# Patient Record
Sex: MALE | Race: WHITE | NOT HISPANIC OR LATINO | Employment: OTHER | URBAN - METROPOLITAN AREA
[De-identification: names, ages, dates, MRNs, and addresses within clinical notes are randomized per-mention and may not be internally consistent; named-entity substitution may affect disease eponyms.]

---

## 2017-02-08 NOTE — PATIENT DISCUSSION
Continue: Maxitrol (neomycin-polymyxin-dexameth): ointment: 3.5-10,000-0.1 mg-unit/g-% 1 a small amount every other day into affected eye 08-

## 2017-08-31 NOTE — PATIENT DISCUSSION
Fuchs' Endothelial Dystrophy Counseling: The diagnosis and its pathophysiology was explained to the patient. The importance of continued follow-up including yearly endothelial cell counts and pachymetry measurements was also explained. The patient was counseled on the prescribed medical treatment and the early morning blurring of vision which will improve as the day progresses. The patient was advised of the possibility of decreased vision over time secondary to corneal edema. The possible need for a future cornea transplant was explained. Return for follow-up as scheduled.

## 2017-10-30 ENCOUNTER — ESTABLISHED COMPREHENSIVE EXAM (OUTPATIENT)
Dept: URBAN - METROPOLITAN AREA CLINIC 39 | Facility: CLINIC | Age: 82
End: 2017-10-30

## 2017-10-30 DIAGNOSIS — H40.051: ICD-10-CM

## 2017-10-30 DIAGNOSIS — H43.813: ICD-10-CM

## 2017-10-30 DIAGNOSIS — H35.3221: ICD-10-CM

## 2017-10-30 DIAGNOSIS — Z96.1: ICD-10-CM

## 2017-10-30 PROCEDURE — 1036F TOBACCO NON-USER: CPT

## 2017-10-30 PROCEDURE — G8756 NO BP MEASURE DOC: HCPCS

## 2017-10-30 PROCEDURE — 4177F TALK PT/CRGVR RE AREDS PREV: CPT

## 2017-10-30 PROCEDURE — 67028 INJECTION EYE DRUG: CPT

## 2017-10-30 PROCEDURE — 9222550 BILAT EXTENDED OPHTHALMOSCOPY, FIRST

## 2017-10-30 PROCEDURE — 92134 CPTRZ OPH DX IMG PST SGM RTA: CPT

## 2017-10-30 PROCEDURE — G8427 DOCREV CUR MEDS BY ELIG CLIN: HCPCS

## 2017-10-30 PROCEDURE — 92014 COMPRE OPH EXAM EST PT 1/>: CPT

## 2017-10-30 PROCEDURE — 2019F DILATED MACUL EXAM DONE: CPT

## 2017-10-30 ASSESSMENT — TONOMETRY
OD_IOP_MMHG: 22
OS_IOP_MMHG: 17

## 2017-10-30 ASSESSMENT — VISUAL ACUITY: OS_CC: 20/30-2

## 2017-12-08 ENCOUNTER — ESTABLISHED COMPREHENSIVE EXAM (OUTPATIENT)
Dept: URBAN - METROPOLITAN AREA CLINIC 39 | Facility: CLINIC | Age: 82
End: 2017-12-08

## 2017-12-08 DIAGNOSIS — H40.051: ICD-10-CM

## 2017-12-08 DIAGNOSIS — H35.363: ICD-10-CM

## 2017-12-08 DIAGNOSIS — H35.3221: ICD-10-CM

## 2017-12-08 DIAGNOSIS — H35.60: ICD-10-CM

## 2017-12-08 DIAGNOSIS — H43.813: ICD-10-CM

## 2017-12-08 DIAGNOSIS — H35.32: ICD-10-CM

## 2017-12-08 PROCEDURE — G8427 DOCREV CUR MEDS BY ELIG CLIN: HCPCS

## 2017-12-08 PROCEDURE — 2019F DILATED MACUL EXAM DONE: CPT

## 2017-12-08 PROCEDURE — 67028 INJECTION EYE DRUG: CPT

## 2017-12-08 PROCEDURE — 4177F TALK PT/CRGVR RE AREDS PREV: CPT

## 2017-12-08 PROCEDURE — 92014 COMPRE OPH EXAM EST PT 1/>: CPT

## 2017-12-08 PROCEDURE — G8756 NO BP MEASURE DOC: HCPCS

## 2017-12-08 PROCEDURE — 1036F TOBACCO NON-USER: CPT

## 2017-12-08 PROCEDURE — 92134 CPTRZ OPH DX IMG PST SGM RTA: CPT

## 2017-12-08 ASSESSMENT — TONOMETRY
OD_IOP_MMHG: 13
OS_IOP_MMHG: 20

## 2017-12-08 ASSESSMENT — VISUAL ACUITY: OS_CC: 20/40-2

## 2018-01-19 ENCOUNTER — ESTABLISHED PATIENT (OUTPATIENT)
Dept: URBAN - METROPOLITAN AREA CLINIC 43 | Facility: CLINIC | Age: 83
End: 2018-01-19

## 2018-01-19 DIAGNOSIS — H35.363: ICD-10-CM

## 2018-01-19 DIAGNOSIS — H35.3213: ICD-10-CM

## 2018-01-19 DIAGNOSIS — H35.3221: ICD-10-CM

## 2018-01-19 DIAGNOSIS — H35.62: ICD-10-CM

## 2018-01-19 DIAGNOSIS — H40.051: ICD-10-CM

## 2018-01-19 DIAGNOSIS — H43.813: ICD-10-CM

## 2018-01-19 PROCEDURE — 2019F DILATED MACUL EXAM DONE: CPT

## 2018-01-19 PROCEDURE — G8428 CUR MEDS NOT DOCUMENT: HCPCS

## 2018-01-19 PROCEDURE — 92012 INTRM OPH EXAM EST PATIENT: CPT

## 2018-01-19 PROCEDURE — 67028 INJECTION EYE DRUG: CPT

## 2018-01-19 PROCEDURE — 1036F TOBACCO NON-USER: CPT

## 2018-01-19 PROCEDURE — 4177F TALK PT/CRGVR RE AREDS PREV: CPT

## 2018-01-19 ASSESSMENT — TONOMETRY
OS_IOP_MMHG: 17
OD_IOP_MMHG: 14

## 2018-01-19 ASSESSMENT — VISUAL ACUITY: OS_SC: 20/30-2

## 2018-01-24 NOTE — PATIENT DISCUSSION
*BLEPHARITIS, OU: PRESCRIBE WARM COMPRESSES AND EYELID SCRUBS QD-BID, ARTIFICIAL TEARS BID-QID, AND USE MAXITROL OINTMENT QHS OU EVERY OTHER DAY. RECOMMEND THE DAILY INTAKE OF OMEGA-3 FATTY ACIDS WITH PRIMARY CARE PHYSICIANS APPROVAL. RETURN FOR FOLLOW-UP AS SCHEDULED.

## 2018-01-24 NOTE — PATIENT DISCUSSION
Continue: Jennifer 128 (sodium chloride): ointment: 5% 1 a small amount every morning into both eyes

## 2018-01-24 NOTE — PATIENT DISCUSSION
Continue: PreserVision AREDS 2 (vit c,d-dg-qrwdj-lutein-zeaxan): capsule: 715-353-26-8 mg-unit-mg-mg 1 capsule twice a day by mouth

## 2018-01-24 NOTE — PATIENT DISCUSSION
Continue: Retaine MGD (PF) (light mineral oil-min oil (pf)): dropperette: 0.5-0.5% 1 drop three times a day into both eyes

## 2018-01-24 NOTE — PATIENT DISCUSSION
FUCHS' ENDOTHELIAL DYSTROPHY,OU:  MILD GUTTATA AND WITH MILD EDEMA. USE CONSUELO 128 5% OINTMENT QAM OU TREATMENT INDICATED. FOLLOW.

## 2018-02-08 NOTE — PATIENT DISCUSSION
Continue: PreserVision AREDS 2 (vit c,y-nm-zozvm-lutein-zeaxan): capsule: 038-401-88-8 mg-unit-mg-mg 1 capsule twice a day by mouth

## 2018-02-08 NOTE — PATIENT DISCUSSION
Continue: Maxitrol (neomycin-polymyxin-dexameth): ointment: 3.5-10,000-0.1 mg-unit/g-% 1 a small amount once a week into both eyes 08-

## 2018-03-07 ENCOUNTER — ESTABLISHED PATIENT (OUTPATIENT)
Dept: URBAN - METROPOLITAN AREA CLINIC 39 | Facility: CLINIC | Age: 83
End: 2018-03-07

## 2018-03-07 DIAGNOSIS — H35.3221: ICD-10-CM

## 2018-03-07 DIAGNOSIS — H35.733: ICD-10-CM

## 2018-03-07 DIAGNOSIS — H35.62: ICD-10-CM

## 2018-03-07 DIAGNOSIS — H35.3213: ICD-10-CM

## 2018-03-07 DIAGNOSIS — H35.363: ICD-10-CM

## 2018-03-07 DIAGNOSIS — H43.813: ICD-10-CM

## 2018-03-07 PROCEDURE — 92012 INTRM OPH EXAM EST PATIENT: CPT

## 2018-03-07 PROCEDURE — G8428 CUR MEDS NOT DOCUMENT: HCPCS

## 2018-03-07 PROCEDURE — G8756 NO BP MEASURE DOC: HCPCS

## 2018-03-07 PROCEDURE — 1036F TOBACCO NON-USER: CPT

## 2018-03-07 PROCEDURE — 2019F DILATED MACUL EXAM DONE: CPT

## 2018-03-07 PROCEDURE — 67028 INJECTION EYE DRUG: CPT

## 2018-03-07 PROCEDURE — 92134 CPTRZ OPH DX IMG PST SGM RTA: CPT

## 2018-03-07 PROCEDURE — 9222650 BILAT EXTENDED OPHTHALMOSCOPY, F/U

## 2018-03-07 PROCEDURE — 4177F TALK PT/CRGVR RE AREDS PREV: CPT

## 2018-03-07 ASSESSMENT — TONOMETRY
OS_IOP_MMHG: 14
OD_IOP_MMHG: 10

## 2018-03-07 ASSESSMENT — VISUAL ACUITY: OS_CC: 20/30-2

## 2018-04-10 NOTE — PATIENT DISCUSSION
Continue: prednisolone acetate (prednisolone acetate): drops,suspension: 1% 1 drop four times a day as directed into right eye 03-

## 2018-04-10 NOTE — PATIENT DISCUSSION
Discussed DMEK  in detail with patient including use of gas bubble to adhere corneal tissue. Discussed laying flat to improve outcome 24-72 hours. Discussed expect blurred vision initially after surgery - possible additional gas inserted in OR. Explained likely blurred vision while healing on average 3-6 months. 20/20 is NOT the goal - aiming for better quality of vision with outcome of 20/30.

## 2018-04-10 NOTE — PATIENT DISCUSSION
Continue: PreserVision AREDS 2 (vit c,l-rn-wisug-lutein-zeaxan): capsule: 572-354-11-9 mg-unit-mg-mg 1 capsule twice a day by mouth

## 2018-04-12 NOTE — PATIENT DISCUSSION
Continue: PreserVision AREDS 2 (vit c,d-zv-rhkhy-lutein-zeaxan): capsule: 556-222-87-8 mg-unit-mg-mg 1 capsule twice a day by mouth

## 2018-04-19 ENCOUNTER — ESTABLISHED PATIENT (OUTPATIENT)
Dept: URBAN - METROPOLITAN AREA CLINIC 35 | Facility: CLINIC | Age: 83
End: 2018-04-19

## 2018-04-19 DIAGNOSIS — H43.813: ICD-10-CM

## 2018-04-19 DIAGNOSIS — H35.3221: ICD-10-CM

## 2018-04-19 DIAGNOSIS — H35.363: ICD-10-CM

## 2018-04-19 DIAGNOSIS — H35.62: ICD-10-CM

## 2018-04-19 DIAGNOSIS — H35.733: ICD-10-CM

## 2018-04-19 DIAGNOSIS — H35.3213: ICD-10-CM

## 2018-04-19 PROCEDURE — 2019F DILATED MACUL EXAM DONE: CPT

## 2018-04-19 PROCEDURE — 67028 INJECTION EYE DRUG: CPT

## 2018-04-19 PROCEDURE — 4177F TALK PT/CRGVR RE AREDS PREV: CPT

## 2018-04-19 PROCEDURE — 1036F TOBACCO NON-USER: CPT

## 2018-04-19 PROCEDURE — G8428 CUR MEDS NOT DOCUMENT: HCPCS

## 2018-04-19 PROCEDURE — 92012 INTRM OPH EXAM EST PATIENT: CPT

## 2018-04-19 PROCEDURE — G8756 NO BP MEASURE DOC: HCPCS

## 2018-04-19 PROCEDURE — 92134 CPTRZ OPH DX IMG PST SGM RTA: CPT

## 2018-04-19 PROCEDURE — 9222650 BILAT EXTENDED OPHTHALMOSCOPY, F/U

## 2018-04-19 ASSESSMENT — VISUAL ACUITY: OS_CC: 20/40+2

## 2018-04-19 ASSESSMENT — TONOMETRY
OD_IOP_MMHG: 13
OS_IOP_MMHG: 16

## 2018-04-19 NOTE — PATIENT DISCUSSION
Continue: PreserVision AREDS 2 (vit c,n-ue-pajmp-lutein-zeaxan): capsule: 123-624-35-6 mg-unit-mg-mg 1 capsule twice a day by mouth

## 2018-05-03 NOTE — PATIENT DISCUSSION
Continue: PreserVision AREDS 2 (vit c,y-dl-aqisj-lutein-zeaxan): capsule: 370-387-49-0 mg-unit-mg-mg 1 capsule twice a day by mouth

## 2018-05-18 NOTE — PATIENT DISCUSSION
Continue: PreserVision AREDS 2 (vit c,d-yt-zmtxe-lutein-zeaxan): capsule: 025-639-25-9 mg-unit-mg-mg 1 capsule twice a day by mouth

## 2018-06-07 NOTE — PATIENT DISCUSSION
Continue: PreserVision AREDS 2 (vit c,z-rb-nzwpw-lutein-zeaxan): capsule: 525-012-92-3 mg-unit-mg-mg 1 capsule twice a day by mouth

## 2018-06-07 NOTE — PATIENT DISCUSSION
Continue: prednisolone acetate (prednisolone acetate): drops,suspension: 1% 1 drop four times a day into right eye

## 2018-06-28 NOTE — PATIENT DISCUSSION
Continue: prednisolone acetate (prednisolone acetate): drops,suspension: 1% 1 drop three times a day into right eye

## 2018-06-28 NOTE — PATIENT DISCUSSION
Continue: PreserVision AREDS 2 (vit c,c-ta-reyxg-lutein-zeaxan): capsule: 499-591-83-1 mg-unit-mg-mg 1 capsule twice a day by mouth

## 2018-06-28 NOTE — PATIENT DISCUSSION
New Prescription: Combigan (brimonidine-timolol): drops: 0.2-0.5% 1 drop three times a day as directed into right eye 06-

## 2018-07-12 NOTE — PATIENT DISCUSSION
Continue: PreserVision AREDS 2 (vit c,k-dn-lkbkw-lutein-zeaxan): capsule: 232-114-41-2 mg-unit-mg-mg 1 capsule twice a day by mouth

## 2018-07-12 NOTE — PATIENT DISCUSSION
Continue: Combigan (brimonidine-timolol): drops: 0.2-0.5% 1 drop three times a day as directed into right eye 06-

## 2018-08-02 NOTE — PATIENT DISCUSSION
Continue: Retaine MGD (PF) (light mineral oil-min oil (pf)): dropperette: 0.5-0.5% 1 drop as needed into left eye

## 2018-08-02 NOTE — PATIENT DISCUSSION
Continue: prednisolone acetate (prednisolone acetate): drops,suspension: 1% 1 drop twice a day into right eye

## 2018-08-02 NOTE — PATIENT DISCUSSION
Continue: PreserVision AREDS 2 (vit c,c-hv-mxnso-lutein-zeaxan): capsule: 795-461-74-2 mg-unit-mg-mg 1 capsule twice a day by mouth

## 2018-08-30 NOTE — PATIENT DISCUSSION
New Prescription: latanoprost (latanoprost): drops: 0.005% 1 drop at bedtime as directed into right eye 08-

## 2018-08-30 NOTE — PATIENT DISCUSSION
Continue: prednisolone acetate (prednisolone acetate): drops,suspension: 1% 1 drop once a day into right eye

## 2018-08-30 NOTE — PATIENT DISCUSSION
Continue: PreserVision AREDS 2 (vit c,x-pa-rpves-lutein-zeaxan): capsule: 333-608-59-2 mg-unit-mg-mg 1 capsule twice a day by mouth

## 2018-10-11 NOTE — PATIENT DISCUSSION
LEFT EYE DMEK SURGERY - PRE-OP INSTRUCTIONS:  I have reviewed the risks, benefits, and alternatives of surgery and anesthesia. The risks of the surgery including the risk of graft detachment, bleeding, infection and need for additional surgery, including a 10% chance of re-bubbling in the event of graft detachment were reviewed. The risk of graft failure and need for repeat keratoplasty were also discussed. We also discussed that there is an extremely low risk of infectious disease transmission when live donor tissue is used despite the [facility] screening for these conditions preoperatively. Postoperative positioning requirements were also reviewed, and the patient was informed that they will need to remain recumbent for 55 minutes of every hour for the first 36 hours after surgery. The risk of postoperative rejection of the graft was also reviewed and the signs and symptoms of this were also discussed. The patient has also been instructed in the usual pre-operative regimen including the need to use Besivance 3 days prior to surgery or the alternative Ofloxacin, and the need to avoid eating or drinking anything except prescription medications after midnight on the morning of surgery. The patient has elected to proceed with Descemet Membrane Endothelial Keratoplasty, left eye. We will schedule this at their convenience.

## 2018-10-11 NOTE — PATIENT DISCUSSION
Continue Prednsiolone to 3 times a week OD- won't go any lower until at least one year out from surgery

## 2018-10-11 NOTE — PATIENT DISCUSSION
FUCHS' ENDOTHELIAL DYSTROPHY,OU:  MODERATE GUTTATA AND MILD EDEMA. PRESCRIBE CONSUELO GTTS TID-QID AND CONSUELO SHERRI QHS. RETURN FOR FOLLOW-UP AS SCHEDULED.

## 2018-10-11 NOTE — PATIENT DISCUSSION
Continue: PreserVision AREDS 2 (vit c,f-if-olodm-lutein-zeaxan): capsule: 335-954-56-7 mg-unit-mg-mg 1 capsule twice a day by mouth

## 2018-11-06 NOTE — PATIENT DISCUSSION
Continue: PreserVision AREDS 2 (vit c,w-ez-sneeq-lutein-zeaxan): capsule: 136-671-47-2 mg-unit-mg-mg 1 capsule twice a day by mouth

## 2018-11-06 NOTE — PATIENT DISCUSSION
Continue: Combigan (brimonidine-timolol): drops: 0.2-0.5% 1 drop three times a day as directed into right eye

## 2018-11-06 NOTE — PATIENT DISCUSSION
Continue: prednisolone acetate (prednisolone acetate): drops,suspension: 1% 1 drop four times a day as directed into left eye

## 2018-11-06 NOTE — PATIENT DISCUSSION
Continue: prednisolone acetate (prednisolone acetate): drops,suspension: 1% 1 drop three times a week into right eye

## 2018-11-08 NOTE — PATIENT DISCUSSION
Stopped Today: latanoprost (latanoprost): drops: 0.005% 1 drop at bedtime as directed into right eye 08-

## 2018-11-08 NOTE — PATIENT DISCUSSION
Continue: PreserVision AREDS 2 (vit c,r-ud-kxzmh-lutein-zeaxan): capsule: 175-092-57-1 mg-unit-mg-mg 1 capsule twice a day by mouth

## 2018-11-15 NOTE — PATIENT DISCUSSION
Continue: PreserVision AREDS 2 (vit c,f-jz-mjliv-lutein-zeaxan): capsule: 047-001-31-4 mg-unit-mg-mg 1 capsule twice a day by mouth

## 2018-11-26 ENCOUNTER — ESTABLISHED COMPREHENSIVE EXAM (OUTPATIENT)
Dept: URBAN - METROPOLITAN AREA CLINIC 39 | Facility: CLINIC | Age: 83
End: 2018-11-26

## 2018-11-26 DIAGNOSIS — H35.3213: ICD-10-CM

## 2018-11-26 DIAGNOSIS — H35.363: ICD-10-CM

## 2018-11-26 DIAGNOSIS — H35.3221: ICD-10-CM

## 2018-11-26 DIAGNOSIS — H43.813: ICD-10-CM

## 2018-11-26 DIAGNOSIS — H35.733: ICD-10-CM

## 2018-11-26 PROCEDURE — G8428 CUR MEDS NOT DOCUMENT: HCPCS

## 2018-11-26 PROCEDURE — G8756 NO BP MEASURE DOC: HCPCS

## 2018-11-26 PROCEDURE — 1036F TOBACCO NON-USER: CPT

## 2018-11-26 PROCEDURE — 67028 INJECTION EYE DRUG: CPT

## 2018-11-26 PROCEDURE — 92014 COMPRE OPH EXAM EST PT 1/>: CPT

## 2018-11-26 PROCEDURE — 2019F DILATED MACUL EXAM DONE: CPT

## 2018-11-26 PROCEDURE — 9222650 BILAT EXTENDED OPHTHALMOSCOPY, F/U

## 2018-11-26 PROCEDURE — G9903 PT SCRN TBCO ID AS NON USER: HCPCS

## 2018-11-26 PROCEDURE — 4177F TALK PT/CRGVR RE AREDS PREV: CPT

## 2018-11-26 PROCEDURE — 92134 CPTRZ OPH DX IMG PST SGM RTA: CPT

## 2018-11-26 ASSESSMENT — VISUAL ACUITY: OS_CC: 20/30-1

## 2018-11-26 ASSESSMENT — TONOMETRY
OD_IOP_MMHG: 13
OS_IOP_MMHG: 15

## 2018-11-30 NOTE — PATIENT DISCUSSION
Continue: PreserVision AREDS 2 (vit c,f-kj-pkdyh-lutein-zeaxan): capsule: 893-032-36-4 mg-unit-mg-mg 1 capsule twice a day by mouth

## 2019-01-03 ENCOUNTER — ESTABLISHED COMPREHENSIVE EXAM (OUTPATIENT)
Dept: URBAN - METROPOLITAN AREA CLINIC 35 | Facility: CLINIC | Age: 84
End: 2019-01-03

## 2019-01-03 DIAGNOSIS — Z96.1: ICD-10-CM

## 2019-01-03 DIAGNOSIS — H35.733: ICD-10-CM

## 2019-01-03 DIAGNOSIS — H35.363: ICD-10-CM

## 2019-01-03 DIAGNOSIS — H35.3221: ICD-10-CM

## 2019-01-03 DIAGNOSIS — H43.813: ICD-10-CM

## 2019-01-03 DIAGNOSIS — H40.1130: ICD-10-CM

## 2019-01-03 DIAGNOSIS — H52.7: ICD-10-CM

## 2019-01-03 DIAGNOSIS — H35.3213: ICD-10-CM

## 2019-01-03 PROCEDURE — 92015 DETERMINE REFRACTIVE STATE: CPT

## 2019-01-03 PROCEDURE — G9903 PT SCRN TBCO ID AS NON USER: HCPCS

## 2019-01-03 PROCEDURE — 2019F DILATED MACUL EXAM DONE: CPT

## 2019-01-03 PROCEDURE — 3285F IOP DOWN <15% OF PRE-SVC LVL: CPT

## 2019-01-03 PROCEDURE — 0517F GLAUCOMA PLAN OF CARE DOCD: CPT

## 2019-01-03 PROCEDURE — 92014 COMPRE OPH EXAM EST PT 1/>: CPT

## 2019-01-03 PROCEDURE — G8427 DOCREV CUR MEDS BY ELIG CLIN: HCPCS

## 2019-01-03 PROCEDURE — 4177F TALK PT/CRGVR RE AREDS PREV: CPT

## 2019-01-03 PROCEDURE — 1036F TOBACCO NON-USER: CPT

## 2019-01-03 PROCEDURE — G8756 NO BP MEASURE DOC: HCPCS

## 2019-01-03 PROCEDURE — 2027F OPTIC NERVE HEAD EVAL DONE: CPT

## 2019-01-03 ASSESSMENT — TONOMETRY
OS_IOP_MMHG: 14
OD_IOP_MMHG: 12

## 2019-01-03 ASSESSMENT — VISUAL ACUITY
OS_SC: 20/70-1
OS_CC: J6+
OS_CC: 20/30+2

## 2019-01-10 NOTE — PATIENT DISCUSSION
Continue: PreserVision AREDS 2 (vit c,y-ue-hwqku-lutein-zeaxan): capsule: 273-523-46-1 mg-unit-mg-mg 1 capsule twice a day by mouth

## 2019-01-10 NOTE — PATIENT DISCUSSION
Continue: Combigan (brimonidine-timolol): drops: 0.2-0.5% 1 drop twice a day as directed into right eye

## 2019-01-10 NOTE — PATIENT DISCUSSION
Continue: Retaine MGD (PF) (light mineral oil-min oil (pf)): dropperette: 0.5-0.5% 1 drop once a day as needed into right eye

## 2019-01-14 ENCOUNTER — ESTABLISHED PATIENT (OUTPATIENT)
Dept: URBAN - METROPOLITAN AREA CLINIC 39 | Facility: CLINIC | Age: 84
End: 2019-01-14

## 2019-01-14 DIAGNOSIS — H43.813: ICD-10-CM

## 2019-01-14 DIAGNOSIS — H35.363: ICD-10-CM

## 2019-01-14 DIAGNOSIS — H35.733: ICD-10-CM

## 2019-01-14 DIAGNOSIS — H35.3221: ICD-10-CM

## 2019-01-14 DIAGNOSIS — H35.3213: ICD-10-CM

## 2019-01-14 PROCEDURE — 92134 CPTRZ OPH DX IMG PST SGM RTA: CPT

## 2019-01-14 PROCEDURE — 4177F TALK PT/CRGVR RE AREDS PREV: CPT

## 2019-01-14 PROCEDURE — 92226 OPHTHALMOSCOPY (SUB): CPT

## 2019-01-14 PROCEDURE — 92012 INTRM OPH EXAM EST PATIENT: CPT

## 2019-01-14 PROCEDURE — 67028 INJECTION EYE DRUG: CPT

## 2019-01-14 PROCEDURE — G8428 CUR MEDS NOT DOCUMENT: HCPCS

## 2019-01-14 PROCEDURE — 1036F TOBACCO NON-USER: CPT

## 2019-01-14 PROCEDURE — G8756 NO BP MEASURE DOC: HCPCS

## 2019-01-14 PROCEDURE — 2019F DILATED MACUL EXAM DONE: CPT

## 2019-01-14 PROCEDURE — G9903 PT SCRN TBCO ID AS NON USER: HCPCS

## 2019-01-14 ASSESSMENT — TONOMETRY: OS_IOP_MMHG: 15

## 2019-01-14 ASSESSMENT — VISUAL ACUITY: OS_CC: 20/30

## 2019-01-25 NOTE — PATIENT DISCUSSION
OHTN (Treatment) Counseling:  I have explained to the patient at length the diagnosis of ocular hypertension and its pathophysiology. I have reviewed the regimen of drops with the patient and stressed the importance of compliance with treatment and follow-up appointments. Return for follow-up as scheduled.

## 2019-01-25 NOTE — PATIENT DISCUSSION
Continue: PreserVision AREDS 2 (vit c,r-yi-quwjj-lutein-zeaxan): capsule: 404-194-61-6 mg-unit-mg-mg 1 capsule twice a day by mouth

## 2019-01-25 NOTE — PATIENT DISCUSSION
OHTN, OU: INTRAOCULAR PRESSURE IS WITHIN ACCEPTABLE LIMITS. STRESSED IMPORTANCE OF REGULAR EYE EXAMS AND CONSISTENT DROP USE. PATIENT INSTRUCTED TO CONTINUE DROPS AS PRESCRIBED AND RETURN FOR FOLLOW-UP AS SCHEDULED.

## 2019-01-31 NOTE — PATIENT DISCUSSION
Continue: PreserVision AREDS 2 (vit c,y-sd-uvsjg-lutein-zeaxan): capsule: 882-940-88-3 mg-unit-mg-mg 1 capsule twice a day by mouth

## 2019-01-31 NOTE — PATIENT DISCUSSION
Continue: Retaine MGD (PF) (light mineral oil-min oil (pf)): dropperette: 0.5-0.5% 1 drop twice a day as needed into right eye

## 2019-02-14 ENCOUNTER — ESTABLISHED PATIENT (OUTPATIENT)
Dept: URBAN - METROPOLITAN AREA CLINIC 35 | Facility: CLINIC | Age: 84
End: 2019-02-14

## 2019-02-14 DIAGNOSIS — H35.733: ICD-10-CM

## 2019-02-14 DIAGNOSIS — H35.3221: ICD-10-CM

## 2019-02-14 DIAGNOSIS — H35.363: ICD-10-CM

## 2019-02-14 DIAGNOSIS — H43.813: ICD-10-CM

## 2019-02-14 DIAGNOSIS — H35.3213: ICD-10-CM

## 2019-02-14 PROCEDURE — 92014 COMPRE OPH EXAM EST PT 1/>: CPT

## 2019-02-14 PROCEDURE — 92134 CPTRZ OPH DX IMG PST SGM RTA: CPT

## 2019-02-14 ASSESSMENT — VISUAL ACUITY: OS_CC: 20/30

## 2019-02-14 ASSESSMENT — TONOMETRY
OD_IOP_MMHG: 14
OS_IOP_MMHG: 18

## 2019-02-21 ENCOUNTER — ESTABLISHED PATIENT (OUTPATIENT)
Dept: URBAN - METROPOLITAN AREA CLINIC 35 | Facility: CLINIC | Age: 84
End: 2019-02-21

## 2019-02-21 DIAGNOSIS — H35.3221: ICD-10-CM

## 2019-02-21 DIAGNOSIS — H35.733: ICD-10-CM

## 2019-02-21 DIAGNOSIS — H00.16: ICD-10-CM

## 2019-02-21 DIAGNOSIS — H35.363: ICD-10-CM

## 2019-02-21 DIAGNOSIS — H35.3213: ICD-10-CM

## 2019-02-21 DIAGNOSIS — H43.813: ICD-10-CM

## 2019-02-21 PROCEDURE — 67028 INJECTION EYE DRUG: CPT

## 2019-02-21 PROCEDURE — 92012 INTRM OPH EXAM EST PATIENT: CPT

## 2019-02-21 ASSESSMENT — TONOMETRY: OS_IOP_MMHG: 14

## 2019-02-21 NOTE — PATIENT DISCUSSION
Continue: PreserVision AREDS 2 (vit c,w-jr-erygx-lutein-zeaxan): capsule: 830-414-53-2 mg-unit-mg-mg 1 capsule twice a day by mouth

## 2019-03-21 ENCOUNTER — ESTABLISHED PATIENT (OUTPATIENT)
Dept: URBAN - METROPOLITAN AREA CLINIC 35 | Facility: CLINIC | Age: 84
End: 2019-03-21

## 2019-03-21 DIAGNOSIS — H35.3221: ICD-10-CM

## 2019-03-21 DIAGNOSIS — H35.3213: ICD-10-CM

## 2019-03-21 PROCEDURE — 92134 CPTRZ OPH DX IMG PST SGM RTA: CPT

## 2019-03-21 PROCEDURE — 92012 INTRM OPH EXAM EST PATIENT: CPT

## 2019-03-21 PROCEDURE — 67028 INJECTION EYE DRUG: CPT

## 2019-03-21 ASSESSMENT — TONOMETRY
OS_IOP_MMHG: 13
OD_IOP_MMHG: 10

## 2019-03-21 ASSESSMENT — VISUAL ACUITY: OS_CC: 20/30

## 2019-03-27 NOTE — PATIENT DISCUSSION
HIgh IOP OS--decrease PF daily, add rhpressa daily ( sample ), and continue combigan TID OS and BID OD

## 2019-03-27 NOTE — PATIENT DISCUSSION
Continue: PreserVision AREDS 2 (vit c,h-hh-nxrtn-lutein-zeaxan): capsule: 540-704-05-6 mg-unit-mg-mg 1 capsule twice a day by mouth

## 2019-03-27 NOTE — PATIENT DISCUSSION
Continue: prednisolone acetate (prednisolone acetate): drops,suspension: 1% 1 drop twice a day as directed into left eye

## 2019-04-25 ENCOUNTER — ESTABLISHED PATIENT (OUTPATIENT)
Dept: URBAN - METROPOLITAN AREA CLINIC 35 | Facility: CLINIC | Age: 84
End: 2019-04-25

## 2019-04-25 DIAGNOSIS — H35.363: ICD-10-CM

## 2019-04-25 DIAGNOSIS — H43.813: ICD-10-CM

## 2019-04-25 DIAGNOSIS — H35.3213: ICD-10-CM

## 2019-04-25 DIAGNOSIS — H35.3221: ICD-10-CM

## 2019-04-25 PROCEDURE — 92226 OPHTHALMOSCOPY (SUB): CPT

## 2019-04-25 PROCEDURE — 67028 INJECTION EYE DRUG: CPT

## 2019-04-25 PROCEDURE — 92134 CPTRZ OPH DX IMG PST SGM RTA: CPT

## 2019-04-25 PROCEDURE — 92012 INTRM OPH EXAM EST PATIENT: CPT

## 2019-04-25 ASSESSMENT — TONOMETRY
OS_IOP_MMHG: 22
OD_IOP_MMHG: 14

## 2019-04-25 ASSESSMENT — VISUAL ACUITY: OS_CC: 20/30-2

## 2019-04-25 NOTE — PATIENT DISCUSSION
Continue: PreserVision AREDS 2 (vit c,x-ew-umzhk-lutein-zeaxan): capsule: 537-390-08-8 mg-unit-mg-mg 1 capsule twice a day by mouth

## 2019-04-25 NOTE — PATIENT DISCUSSION
Continue: Combigan (brimonidine-timolol): drops: 0.2-0.5% 1 drop three times a day as directed into left eye 04-

## 2019-04-25 NOTE — PATIENT DISCUSSION
Continue: prednisolone acetate (prednisolone acetate): drops,suspension: 1% 1 drop once a day as directed into left eye

## 2019-05-15 NOTE — PATIENT DISCUSSION
Stopped Today: prednisolone acetate (prednisolone acetate): drops,suspension: 1% 1 drop once a day as directed into left eye

## 2019-05-15 NOTE — PATIENT DISCUSSION
Continue: prednisolone acetate (prednisolone acetate): drops,suspension: 1% 1 drop three times a week into both eyes

## 2019-05-15 NOTE — PATIENT DISCUSSION
Continue: PreserVision AREDS 2 (vit c,y-ah-cecqr-lutein-zeaxan): capsule: 151-562-97-7 mg-unit-mg-mg 1 capsule twice a day by mouth

## 2019-05-15 NOTE — PATIENT DISCUSSION
Stopped Today: Combigan (brimonidine-timolol): drops: 0.2-0.5% 1 drop three times a day as directed into left eye 04-

## 2019-06-20 NOTE — PATIENT DISCUSSION
Continue: PreserVision AREDS 2 (vit c,f-lf-nyvku-lutein-zeaxan): capsule: 614-948-74-7 mg-unit-mg-mg 1 capsule twice a day by mouth

## 2019-08-08 NOTE — PATIENT DISCUSSION
Continue: PreserVision AREDS 2 (vit c,m-vt-nclng-lutein-zeaxan): capsule: 725-763-16-1 mg-unit-mg-mg 1 capsule twice a day by mouth

## 2019-10-31 ENCOUNTER — ESTABLISHED COMPREHENSIVE EXAM (OUTPATIENT)
Dept: URBAN - METROPOLITAN AREA CLINIC 35 | Facility: CLINIC | Age: 84
End: 2019-10-31

## 2019-10-31 DIAGNOSIS — H35.363: ICD-10-CM

## 2019-10-31 DIAGNOSIS — H35.3221: ICD-10-CM

## 2019-10-31 DIAGNOSIS — H35.733: ICD-10-CM

## 2019-10-31 DIAGNOSIS — H43.813: ICD-10-CM

## 2019-10-31 DIAGNOSIS — H35.3213: ICD-10-CM

## 2019-10-31 PROCEDURE — 9222650 BILAT EXTENDED OPHTHALMOSCOPY, F/U

## 2019-10-31 PROCEDURE — 67028 INJECTION EYE DRUG: CPT

## 2019-10-31 PROCEDURE — 92014 COMPRE OPH EXAM EST PT 1/>: CPT

## 2019-10-31 PROCEDURE — 92250 FUNDUS PHOTOGRAPHY W/I&R: CPT

## 2019-10-31 PROCEDURE — 92134 CPTRZ OPH DX IMG PST SGM RTA: CPT

## 2019-10-31 ASSESSMENT — TONOMETRY
OS_IOP_MMHG: 19
OD_IOP_MMHG: 13

## 2019-10-31 ASSESSMENT — VISUAL ACUITY: OS_CC: 20/30-1

## 2019-11-14 NOTE — PATIENT DISCUSSION
Continue: PreserVision AREDS 2 (vit c,p-wx-gcgwa-lutein-zeaxan): capsule: 663-860-47-6 mg-unit-mg-mg 1 capsule twice a day by mouth

## 2019-12-05 ENCOUNTER — ESTABLISHED PATIENT (OUTPATIENT)
Dept: URBAN - METROPOLITAN AREA CLINIC 35 | Facility: CLINIC | Age: 84
End: 2019-12-05

## 2019-12-05 DIAGNOSIS — H35.3221: ICD-10-CM

## 2019-12-05 DIAGNOSIS — H35.363: ICD-10-CM

## 2019-12-05 DIAGNOSIS — H35.733: ICD-10-CM

## 2019-12-05 DIAGNOSIS — H43.813: ICD-10-CM

## 2019-12-05 DIAGNOSIS — H35.3213: ICD-10-CM

## 2019-12-05 PROCEDURE — 67028 INJECTION EYE DRUG: CPT

## 2019-12-05 PROCEDURE — 92250 FUNDUS PHOTOGRAPHY W/I&R: CPT

## 2019-12-05 PROCEDURE — 92012 INTRM OPH EXAM EST PATIENT: CPT

## 2019-12-05 ASSESSMENT — VISUAL ACUITY: OS_CC: 20/40-2

## 2019-12-05 ASSESSMENT — TONOMETRY
OS_IOP_MMHG: 15
OD_IOP_MMHG: 13

## 2019-12-19 NOTE — PATIENT DISCUSSION
New Prescription: prednisolone acetate (prednisolone acetate): drops,suspension: 1% 1 drop once a day as directed into affected eye 12-

## 2019-12-19 NOTE — PATIENT DISCUSSION
Continue: PreserVision AREDS 2 (vit c,z-qt-bmsra-lutein-zeaxan): capsule: 862-131-20-7 mg-unit-mg-mg 1 capsule twice a day by mouth

## 2019-12-19 NOTE — PATIENT DISCUSSION
Stopped Today: prednisolone acetate (prednisolone acetate): drops,suspension: 1% 1 drop three times a week into both eyes

## 2020-01-30 ENCOUNTER — ESTABLISHED PATIENT (OUTPATIENT)
Dept: URBAN - METROPOLITAN AREA CLINIC 35 | Facility: CLINIC | Age: 85
End: 2020-01-30

## 2020-01-30 DIAGNOSIS — H35.3221: ICD-10-CM

## 2020-01-30 DIAGNOSIS — H35.3213: ICD-10-CM

## 2020-01-30 PROCEDURE — 92012 INTRM OPH EXAM EST PATIENT: CPT

## 2020-01-30 PROCEDURE — 92134 CPTRZ OPH DX IMG PST SGM RTA: CPT

## 2020-01-30 PROCEDURE — 92250 FUNDUS PHOTOGRAPHY W/I&R: CPT

## 2020-01-30 PROCEDURE — 67028 INJECTION EYE DRUG: CPT

## 2020-01-30 ASSESSMENT — TONOMETRY
OS_IOP_MMHG: 19
OD_IOP_MMHG: 11

## 2020-01-30 ASSESSMENT — VISUAL ACUITY: OS_CC: 20/30

## 2020-03-12 ENCOUNTER — ESTABLISHED PATIENT (OUTPATIENT)
Dept: URBAN - METROPOLITAN AREA CLINIC 35 | Facility: CLINIC | Age: 85
End: 2020-03-12

## 2020-03-12 DIAGNOSIS — H35.3213: ICD-10-CM

## 2020-03-12 DIAGNOSIS — H43.813: ICD-10-CM

## 2020-03-12 DIAGNOSIS — H35.363: ICD-10-CM

## 2020-03-12 DIAGNOSIS — H10.12: ICD-10-CM

## 2020-03-12 DIAGNOSIS — H35.3221: ICD-10-CM

## 2020-03-12 DIAGNOSIS — H35.733: ICD-10-CM

## 2020-03-12 PROCEDURE — 67028 INJECTION EYE DRUG: CPT

## 2020-03-12 PROCEDURE — 92014 COMPRE OPH EXAM EST PT 1/>: CPT

## 2020-03-12 PROCEDURE — J0178PRE EYLEA PREFILLED SYRINGE

## 2020-03-12 PROCEDURE — 92201 OPSCPY EXTND RTA DRAW UNI/BI: CPT

## 2020-03-12 PROCEDURE — 92134 CPTRZ OPH DX IMG PST SGM RTA: CPT

## 2020-03-12 ASSESSMENT — TONOMETRY
OD_IOP_MMHG: 12
OS_IOP_MMHG: 22

## 2020-03-12 ASSESSMENT — VISUAL ACUITY
OS_CC: 20/60
OS_PH: 20/50

## 2020-04-09 ENCOUNTER — ESTABLISHED PATIENT (OUTPATIENT)
Dept: URBAN - METROPOLITAN AREA CLINIC 35 | Facility: CLINIC | Age: 85
End: 2020-04-09

## 2020-04-09 DIAGNOSIS — H35.3213: ICD-10-CM

## 2020-04-09 DIAGNOSIS — H43.813: ICD-10-CM

## 2020-04-09 DIAGNOSIS — H35.733: ICD-10-CM

## 2020-04-09 DIAGNOSIS — H35.3221: ICD-10-CM

## 2020-04-09 DIAGNOSIS — H35.363: ICD-10-CM

## 2020-04-09 PROCEDURE — 92201 OPSCPY EXTND RTA DRAW UNI/BI: CPT

## 2020-04-09 PROCEDURE — 92014 COMPRE OPH EXAM EST PT 1/>: CPT

## 2020-04-09 PROCEDURE — 92134 CPTRZ OPH DX IMG PST SGM RTA: CPT

## 2020-04-09 PROCEDURE — J0178PRE EYLEA PREFILLED SYRINGE

## 2020-04-09 PROCEDURE — 67028 INJECTION EYE DRUG: CPT

## 2020-04-09 ASSESSMENT — VISUAL ACUITY: OS_CC: 20/40-2

## 2020-05-14 ENCOUNTER — ESTABLISHED PATIENT (OUTPATIENT)
Dept: URBAN - METROPOLITAN AREA CLINIC 35 | Facility: CLINIC | Age: 85
End: 2020-05-14

## 2020-05-14 DIAGNOSIS — H43.813: ICD-10-CM

## 2020-05-14 DIAGNOSIS — H35.3213: ICD-10-CM

## 2020-05-14 DIAGNOSIS — H35.733: ICD-10-CM

## 2020-05-14 DIAGNOSIS — H35.363: ICD-10-CM

## 2020-05-14 DIAGNOSIS — H35.3221: ICD-10-CM

## 2020-05-14 PROCEDURE — 92014 COMPRE OPH EXAM EST PT 1/>: CPT

## 2020-05-14 PROCEDURE — 67028 INJECTION EYE DRUG: CPT

## 2020-05-14 PROCEDURE — 92250 FUNDUS PHOTOGRAPHY W/I&R: CPT

## 2020-05-14 PROCEDURE — J0178PRE EYLEA PREFILLED SYRINGE

## 2020-05-14 PROCEDURE — 92134 CPTRZ OPH DX IMG PST SGM RTA: CPT

## 2020-05-14 ASSESSMENT — VISUAL ACUITY: OS_CC: 20/50

## 2020-05-21 NOTE — PATIENT DISCUSSION
Continue: Thera Tears Nutrition (om-3-epa-dha-fish oil-flax-e): capsule: 778-235-095-61 lq-qv-qy-unit

## 2020-05-21 NOTE — PATIENT DISCUSSION
Continue: PreserVision AREDS 2 (vit c,z-eo-abwqr-lutein-zeaxan): capsule: 524-426-80-0 mg-unit-mg-mg 1 capsule twice a day by mouth

## 2020-06-11 ENCOUNTER — ESTABLISHED PATIENT (OUTPATIENT)
Dept: URBAN - METROPOLITAN AREA CLINIC 35 | Facility: CLINIC | Age: 85
End: 2020-06-11

## 2020-06-11 DIAGNOSIS — H35.363: ICD-10-CM

## 2020-06-11 DIAGNOSIS — H35.733: ICD-10-CM

## 2020-06-11 DIAGNOSIS — H43.813: ICD-10-CM

## 2020-06-11 DIAGNOSIS — H35.3221: ICD-10-CM

## 2020-06-11 DIAGNOSIS — H35.3213: ICD-10-CM

## 2020-06-11 PROCEDURE — 67028 INJECTION EYE DRUG: CPT

## 2020-06-11 PROCEDURE — 92201 OPSCPY EXTND RTA DRAW UNI/BI: CPT

## 2020-06-11 PROCEDURE — J0178PRE EYLEA PREFILLED SYRINGE

## 2020-06-11 PROCEDURE — 92134 CPTRZ OPH DX IMG PST SGM RTA: CPT

## 2020-06-11 PROCEDURE — 92012 INTRM OPH EXAM EST PATIENT: CPT

## 2020-06-11 ASSESSMENT — VISUAL ACUITY: OS_CC: 20/60-1

## 2020-07-09 ENCOUNTER — ESTABLISHED PATIENT (OUTPATIENT)
Dept: URBAN - METROPOLITAN AREA CLINIC 35 | Facility: CLINIC | Age: 85
End: 2020-07-09

## 2020-07-09 DIAGNOSIS — H35.733: ICD-10-CM

## 2020-07-09 DIAGNOSIS — H35.363: ICD-10-CM

## 2020-07-09 DIAGNOSIS — H35.3213: ICD-10-CM

## 2020-07-09 DIAGNOSIS — H35.3221: ICD-10-CM

## 2020-07-09 DIAGNOSIS — H43.813: ICD-10-CM

## 2020-07-09 PROCEDURE — 67028 INJECTION EYE DRUG: CPT

## 2020-07-09 PROCEDURE — 92012 INTRM OPH EXAM EST PATIENT: CPT

## 2020-07-09 PROCEDURE — 92134 CPTRZ OPH DX IMG PST SGM RTA: CPT

## 2020-07-09 PROCEDURE — J0178PRE EYLEA PREFILLED SYRINGE

## 2020-07-09 PROCEDURE — 92201 OPSCPY EXTND RTA DRAW UNI/BI: CPT

## 2020-07-09 ASSESSMENT — VISUAL ACUITY: OS_CC: 20/40-2

## 2020-08-06 ENCOUNTER — ESTABLISHED PATIENT (OUTPATIENT)
Dept: URBAN - METROPOLITAN AREA CLINIC 35 | Facility: CLINIC | Age: 85
End: 2020-08-06

## 2020-08-06 DIAGNOSIS — H35.3221: ICD-10-CM

## 2020-08-06 DIAGNOSIS — H35.3213: ICD-10-CM

## 2020-08-06 DIAGNOSIS — H35.363: ICD-10-CM

## 2020-08-06 DIAGNOSIS — H43.813: ICD-10-CM

## 2020-08-06 DIAGNOSIS — H35.733: ICD-10-CM

## 2020-08-06 PROCEDURE — 92202 OPSCPY EXTND ON/MAC DRAW: CPT

## 2020-08-06 PROCEDURE — 67028 INJECTION EYE DRUG: CPT

## 2020-08-06 PROCEDURE — J0178PRE EYLEA PREFILLED SYRINGE

## 2020-08-06 PROCEDURE — 92014 COMPRE OPH EXAM EST PT 1/>: CPT

## 2020-08-06 PROCEDURE — 92134 CPTRZ OPH DX IMG PST SGM RTA: CPT

## 2020-08-06 ASSESSMENT — VISUAL ACUITY: OS_CC: 20/40-2

## 2020-08-06 ASSESSMENT — TONOMETRY
OS_IOP_MMHG: 25
OD_IOP_MMHG: 15

## 2020-09-03 ENCOUNTER — ESTABLISHED PATIENT (OUTPATIENT)
Dept: URBAN - METROPOLITAN AREA CLINIC 35 | Facility: CLINIC | Age: 85
End: 2020-09-03

## 2020-09-03 ENCOUNTER — ESTABLISHED COMPREHENSIVE EXAM (OUTPATIENT)
Dept: URBAN - METROPOLITAN AREA CLINIC 35 | Facility: CLINIC | Age: 85
End: 2020-09-03

## 2020-09-03 DIAGNOSIS — H35.3221: ICD-10-CM

## 2020-09-03 DIAGNOSIS — H35.363: ICD-10-CM

## 2020-09-03 DIAGNOSIS — Z96.1: ICD-10-CM

## 2020-09-03 DIAGNOSIS — H40.1130: ICD-10-CM

## 2020-09-03 DIAGNOSIS — H35.3213: ICD-10-CM

## 2020-09-03 DIAGNOSIS — H35.733: ICD-10-CM

## 2020-09-03 DIAGNOSIS — H43.813: ICD-10-CM

## 2020-09-03 DIAGNOSIS — H35.30: ICD-10-CM

## 2020-09-03 DIAGNOSIS — H10.12: ICD-10-CM

## 2020-09-03 DIAGNOSIS — H52.7: ICD-10-CM

## 2020-09-03 PROCEDURE — 67028 INJECTION EYE DRUG: CPT

## 2020-09-03 PROCEDURE — 92201 OPSCPY EXTND RTA DRAW UNI/BI: CPT

## 2020-09-03 PROCEDURE — 92015 DETERMINE REFRACTIVE STATE: CPT

## 2020-09-03 PROCEDURE — 92134 CPTRZ OPH DX IMG PST SGM RTA: CPT

## 2020-09-03 PROCEDURE — J0178PRE EYLEA PREFILLED SYRINGE

## 2020-09-03 PROCEDURE — 92012 INTRM OPH EXAM EST PATIENT: CPT

## 2020-09-03 PROCEDURE — 92014 COMPRE OPH EXAM EST PT 1/>: CPT

## 2020-09-03 ASSESSMENT — TONOMETRY
OS_IOP_MMHG: 20
OD_IOP_MMHG: 18
OD_IOP_MMHG: 18
OS_IOP_MMHG: 20

## 2020-09-03 ASSESSMENT — VISUAL ACUITY
OS_CC: 20/40-1
OS_CC: 20/40-1
OS_CC: J6

## 2020-09-23 NOTE — PATIENT DISCUSSION
Continue: Retaine MGD (PF) (light mineral oil-min oil (pf)): dropperette: 0.5-0.5% 1 drop three times a day as needed into both eyes

## 2020-09-23 NOTE — PATIENT DISCUSSION
Continue: PreserVision AREDS 2 (vit c,a-ec-futja-lutein-zeaxan): capsule: 200-289-62-0 mg-unit-mg-mg 1 capsule twice a day by mouth

## 2020-09-23 NOTE — PATIENT DISCUSSION
Continue: Thera Tears Nutrition (om-3-epa-dha-fish oil-flax-e): capsule: 672-735-893-61 qr-py-nv-unit

## 2020-10-01 ENCOUNTER — ESTABLISHED PATIENT (OUTPATIENT)
Dept: URBAN - METROPOLITAN AREA CLINIC 35 | Facility: CLINIC | Age: 85
End: 2020-10-01

## 2020-10-01 DIAGNOSIS — H35.363: ICD-10-CM

## 2020-10-01 DIAGNOSIS — H40.1130: ICD-10-CM

## 2020-10-01 DIAGNOSIS — H43.813: ICD-10-CM

## 2020-10-01 DIAGNOSIS — H35.733: ICD-10-CM

## 2020-10-01 DIAGNOSIS — H35.3221: ICD-10-CM

## 2020-10-01 DIAGNOSIS — H35.3213: ICD-10-CM

## 2020-10-01 PROCEDURE — 92202 OPSCPY EXTND ON/MAC DRAW: CPT

## 2020-10-01 PROCEDURE — 67028 INJECTION EYE DRUG: CPT

## 2020-10-01 PROCEDURE — 92134 CPTRZ OPH DX IMG PST SGM RTA: CPT

## 2020-10-01 PROCEDURE — J0178PRE EYLEA PREFILLED SYRINGE

## 2020-10-01 PROCEDURE — 92012 INTRM OPH EXAM EST PATIENT: CPT

## 2020-10-01 ASSESSMENT — TONOMETRY
OD_IOP_MMHG: 17
OS_IOP_MMHG: 17

## 2020-10-01 ASSESSMENT — VISUAL ACUITY: OS_CC: 20/50

## 2020-10-01 NOTE — PATIENT DISCUSSION
Continue: Thera Tears Nutrition (om-3-epa-dha-fish oil-flax-e): capsule: 785-845-813-61 mf-zd-gh-unit

## 2020-10-01 NOTE — PATIENT DISCUSSION
Continue: PreserVision AREDS 2 (vit c,o-lc-bnbvv-lutein-zeaxan): capsule: 376-293-47-8 mg-unit-mg-mg 1 capsule twice a day by mouth

## 2020-10-29 ENCOUNTER — ESTABLISHED PATIENT (OUTPATIENT)
Dept: URBAN - METROPOLITAN AREA CLINIC 35 | Facility: CLINIC | Age: 85
End: 2020-10-29

## 2020-10-29 DIAGNOSIS — H35.3213: ICD-10-CM

## 2020-10-29 DIAGNOSIS — H35.3221: ICD-10-CM

## 2020-10-29 PROCEDURE — 92202 OPSCPY EXTND ON/MAC DRAW: CPT

## 2020-10-29 PROCEDURE — J0178PRE EYLEA PREFILLED SYRINGE

## 2020-10-29 PROCEDURE — 92012 INTRM OPH EXAM EST PATIENT: CPT

## 2020-10-29 PROCEDURE — 67028 INJECTION EYE DRUG: CPT

## 2020-10-29 PROCEDURE — 92134 CPTRZ OPH DX IMG PST SGM RTA: CPT

## 2020-10-29 ASSESSMENT — VISUAL ACUITY: OS_CC: 20/50-2

## 2020-10-29 ASSESSMENT — TONOMETRY
OD_IOP_MMHG: 16
OS_IOP_MMHG: 21

## 2020-12-03 ENCOUNTER — ESTABLISHED PATIENT (OUTPATIENT)
Dept: URBAN - METROPOLITAN AREA CLINIC 35 | Facility: CLINIC | Age: 85
End: 2020-12-03

## 2020-12-03 DIAGNOSIS — H35.733: ICD-10-CM

## 2020-12-03 DIAGNOSIS — H35.3221: ICD-10-CM

## 2020-12-03 DIAGNOSIS — H43.813: ICD-10-CM

## 2020-12-03 DIAGNOSIS — H35.3213: ICD-10-CM

## 2020-12-03 DIAGNOSIS — H35.363: ICD-10-CM

## 2020-12-03 DIAGNOSIS — H40.1130: ICD-10-CM

## 2020-12-03 PROCEDURE — 92012 INTRM OPH EXAM EST PATIENT: CPT

## 2020-12-03 PROCEDURE — J0178PRE EYLEA PREFILLED SYRINGE

## 2020-12-03 PROCEDURE — 92202 OPSCPY EXTND ON/MAC DRAW: CPT

## 2020-12-03 PROCEDURE — 67028 INJECTION EYE DRUG: CPT

## 2020-12-03 PROCEDURE — 92134 CPTRZ OPH DX IMG PST SGM RTA: CPT

## 2020-12-03 ASSESSMENT — VISUAL ACUITY: OS_CC: 20/50

## 2020-12-03 ASSESSMENT — TONOMETRY
OS_IOP_MMHG: 21
OD_IOP_MMHG: 13

## 2021-01-07 ENCOUNTER — ESTABLISHED PATIENT (OUTPATIENT)
Dept: URBAN - METROPOLITAN AREA CLINIC 35 | Facility: CLINIC | Age: 86
End: 2021-01-07

## 2021-01-07 DIAGNOSIS — H43.813: ICD-10-CM

## 2021-01-07 DIAGNOSIS — H35.363: ICD-10-CM

## 2021-01-07 DIAGNOSIS — H35.3221: ICD-10-CM

## 2021-01-07 DIAGNOSIS — H35.30: ICD-10-CM

## 2021-01-07 DIAGNOSIS — H35.733: ICD-10-CM

## 2021-01-07 DIAGNOSIS — H35.3213: ICD-10-CM

## 2021-01-07 PROCEDURE — 92134 CPTRZ OPH DX IMG PST SGM RTA: CPT

## 2021-01-07 PROCEDURE — 67028 INJECTION EYE DRUG: CPT

## 2021-01-07 PROCEDURE — 92202 OPSCPY EXTND ON/MAC DRAW: CPT

## 2021-01-07 PROCEDURE — 99214 OFFICE O/P EST MOD 30 MIN: CPT

## 2021-01-07 PROCEDURE — J0178PRE EYLEA PREFILLED SYRINGE

## 2021-01-07 ASSESSMENT — TONOMETRY
OD_IOP_MMHG: 11
OS_IOP_MMHG: 16

## 2021-01-07 ASSESSMENT — VISUAL ACUITY: OS_CC: 20/400

## 2021-02-11 ENCOUNTER — ESTABLISHED PATIENT (OUTPATIENT)
Dept: URBAN - METROPOLITAN AREA CLINIC 35 | Facility: CLINIC | Age: 86
End: 2021-02-11

## 2021-02-11 DIAGNOSIS — H35.733: ICD-10-CM

## 2021-02-11 DIAGNOSIS — H35.3221: ICD-10-CM

## 2021-02-11 DIAGNOSIS — H35.3213: ICD-10-CM

## 2021-02-11 DIAGNOSIS — H43.813: ICD-10-CM

## 2021-02-11 DIAGNOSIS — H35.363: ICD-10-CM

## 2021-02-11 PROCEDURE — 92134 CPTRZ OPH DX IMG PST SGM RTA: CPT

## 2021-02-11 PROCEDURE — J0178PRE EYLEA PREFILLED SYRINGE

## 2021-02-11 PROCEDURE — 99214 OFFICE O/P EST MOD 30 MIN: CPT

## 2021-02-11 PROCEDURE — 92202 OPSCPY EXTND ON/MAC DRAW: CPT

## 2021-02-11 PROCEDURE — 67028 INJECTION EYE DRUG: CPT

## 2021-02-11 ASSESSMENT — TONOMETRY
OD_IOP_MMHG: 15
OS_IOP_MMHG: 21

## 2021-02-11 ASSESSMENT — VISUAL ACUITY: OS_CC: 20/40-1

## 2021-03-11 ENCOUNTER — ESTABLISHED PATIENT (OUTPATIENT)
Dept: URBAN - METROPOLITAN AREA CLINIC 35 | Facility: CLINIC | Age: 86
End: 2021-03-11

## 2021-03-11 DIAGNOSIS — H35.363: ICD-10-CM

## 2021-03-11 DIAGNOSIS — H35.3221: ICD-10-CM

## 2021-03-11 DIAGNOSIS — H43.813: ICD-10-CM

## 2021-03-11 DIAGNOSIS — H35.3213: ICD-10-CM

## 2021-03-11 DIAGNOSIS — H35.30: ICD-10-CM

## 2021-03-11 DIAGNOSIS — H35.733: ICD-10-CM

## 2021-03-11 PROCEDURE — 99213 OFFICE O/P EST LOW 20 MIN: CPT

## 2021-03-11 PROCEDURE — 67028 INJECTION EYE DRUG: CPT

## 2021-03-11 PROCEDURE — J0178PRE EYLEA PREFILLED SYRINGE

## 2021-03-11 PROCEDURE — 92202 OPSCPY EXTND ON/MAC DRAW: CPT

## 2021-03-11 PROCEDURE — 92134 CPTRZ OPH DX IMG PST SGM RTA: CPT

## 2021-03-11 ASSESSMENT — TONOMETRY
OD_IOP_MMHG: 14
OS_IOP_MMHG: 18

## 2021-03-11 ASSESSMENT — VISUAL ACUITY: OS_CC: 20/50-2

## 2021-04-08 ENCOUNTER — ESTABLISHED PATIENT (OUTPATIENT)
Dept: URBAN - METROPOLITAN AREA CLINIC 35 | Facility: CLINIC | Age: 86
End: 2021-04-08

## 2021-04-08 DIAGNOSIS — H35.3213: ICD-10-CM

## 2021-04-08 DIAGNOSIS — H35.3221: ICD-10-CM

## 2021-04-08 DIAGNOSIS — H43.813: ICD-10-CM

## 2021-04-08 DIAGNOSIS — G45.3: ICD-10-CM

## 2021-04-08 DIAGNOSIS — H35.733: ICD-10-CM

## 2021-04-08 DIAGNOSIS — H35.363: ICD-10-CM

## 2021-04-08 PROCEDURE — 92202 OPSCPY EXTND ON/MAC DRAW: CPT

## 2021-04-08 PROCEDURE — 99213 OFFICE O/P EST LOW 20 MIN: CPT

## 2021-04-08 PROCEDURE — J0178PRE EYLEA PREFILLED SYRINGE

## 2021-04-08 PROCEDURE — 67028 INJECTION EYE DRUG: CPT

## 2021-04-08 PROCEDURE — 92134 CPTRZ OPH DX IMG PST SGM RTA: CPT

## 2021-04-08 ASSESSMENT — TONOMETRY
OS_IOP_MMHG: 18
OD_IOP_MMHG: 16

## 2021-04-08 ASSESSMENT — VISUAL ACUITY: OS_CC: 20/60

## 2021-05-27 NOTE — PATIENT DISCUSSION
Continue: Thera Tears Nutrition (om-3-epa-dha-fish oil-flax-e): capsule: 956-971-842-61 st-sr-xl-unit

## 2021-05-27 NOTE — PATIENT DISCUSSION
Continue: PreserVision AREDS 2 (vit c,w-il-cgcmu-lutein-zeaxan): capsule: 227-116-54-8 mg-unit-mg-mg 1 capsule twice a day by mouth

## 2021-05-27 NOTE — PATIENT DISCUSSION
IOP in good range today. Patient has a allergy to Brimonidine/Combigan. Repeat OCT RNFL performed today and reviewed with patient.

## 2021-05-27 NOTE — PATIENT DISCUSSION
Patient should continue Retaine MGD TID OU and Theratears Nutrition TID PO. Patient also can continue Lumify for her redness QD OU, use sparingly.

## 2021-05-27 NOTE — PATIENT DISCUSSION
Patient currently not only Prednisolone but must always keep on hand. If patient, has photophobia or decreasing va she needs to use once daily.

## 2021-06-03 ENCOUNTER — ESTABLISHED PATIENT (OUTPATIENT)
Dept: URBAN - METROPOLITAN AREA CLINIC 35 | Facility: CLINIC | Age: 86
End: 2021-06-03

## 2021-06-03 DIAGNOSIS — G45.3: ICD-10-CM

## 2021-06-03 DIAGNOSIS — H43.813: ICD-10-CM

## 2021-06-03 DIAGNOSIS — H35.363: ICD-10-CM

## 2021-06-03 DIAGNOSIS — H35.733: ICD-10-CM

## 2021-06-03 DIAGNOSIS — H35.3213: ICD-10-CM

## 2021-06-03 DIAGNOSIS — H35.3221: ICD-10-CM

## 2021-06-03 PROCEDURE — 92202 OPSCPY EXTND ON/MAC DRAW: CPT

## 2021-06-03 PROCEDURE — J0178PRE EYLEA PREFILLED SYRINGE

## 2021-06-03 PROCEDURE — 92134 CPTRZ OPH DX IMG PST SGM RTA: CPT

## 2021-06-03 PROCEDURE — 67028 INJECTION EYE DRUG: CPT

## 2021-06-03 PROCEDURE — 99213 OFFICE O/P EST LOW 20 MIN: CPT

## 2021-06-03 ASSESSMENT — VISUAL ACUITY: OS_CC: 20/60-1

## 2021-06-03 ASSESSMENT — TONOMETRY
OD_IOP_MMHG: 17
OS_IOP_MMHG: 22

## 2021-08-05 ENCOUNTER — ESTABLISHED PATIENT (OUTPATIENT)
Dept: URBAN - METROPOLITAN AREA CLINIC 35 | Facility: CLINIC | Age: 86
End: 2021-08-05

## 2021-08-05 DIAGNOSIS — H35.3221: ICD-10-CM

## 2021-08-05 DIAGNOSIS — H35.3213: ICD-10-CM

## 2021-08-05 DIAGNOSIS — H43.813: ICD-10-CM

## 2021-08-05 DIAGNOSIS — H35.733: ICD-10-CM

## 2021-08-05 DIAGNOSIS — H35.363: ICD-10-CM

## 2021-08-05 PROCEDURE — J0178PRE EYLEA PREFILLED SYRINGE

## 2021-08-05 PROCEDURE — 92202 OPSCPY EXTND ON/MAC DRAW: CPT

## 2021-08-05 PROCEDURE — 99213 OFFICE O/P EST LOW 20 MIN: CPT

## 2021-08-05 PROCEDURE — 92134 CPTRZ OPH DX IMG PST SGM RTA: CPT

## 2021-08-05 PROCEDURE — 67028 INJECTION EYE DRUG: CPT

## 2021-08-05 ASSESSMENT — TONOMETRY
OS_IOP_MMHG: 13
OD_IOP_MMHG: 9

## 2021-08-05 ASSESSMENT — VISUAL ACUITY: OS_CC: 20/40-2

## 2021-11-04 ENCOUNTER — ESTABLISHED PATIENT (OUTPATIENT)
Dept: URBAN - METROPOLITAN AREA CLINIC 35 | Facility: CLINIC | Age: 86
End: 2021-11-04

## 2021-11-04 DIAGNOSIS — H35.3213: ICD-10-CM

## 2021-11-04 DIAGNOSIS — H35.733: ICD-10-CM

## 2021-11-04 DIAGNOSIS — H35.3221: ICD-10-CM

## 2021-11-04 DIAGNOSIS — H43.813: ICD-10-CM

## 2021-11-04 PROCEDURE — 99214 OFFICE O/P EST MOD 30 MIN: CPT

## 2021-11-04 PROCEDURE — 92134 CPTRZ OPH DX IMG PST SGM RTA: CPT

## 2021-11-04 ASSESSMENT — TONOMETRY
OS_IOP_MMHG: 14
OD_IOP_MMHG: 9

## 2021-11-04 ASSESSMENT — VISUAL ACUITY: OS_SC: CF 1FT

## 2022-11-10 NOTE — PROCEDURE NOTE: CLINICAL
PROCEDURE NOTE: Suture Removal OS. Diagnosis: Corneal Transplant. Anesthesia: Topical. Prep: Antibiotic Drops. Risks, Benefits and Alternatives discussed, patient gives verbal consent to proceed. The patient wished to proceed. A time out to confirm the patient, site, and procedure has been achieved. The site has been marked. The suture(s) were removed at the slit lamp with a forcep instrument along with a 30 gauge. Patient tolerated the procedure well. There were no complications. Post procedure instructions given. Gideon Martinez

## 2024-01-03 NOTE — PATIENT DISCUSSION
COUNSELING:
Continue: Jennifer 128 (sodium chloride): ointment: 5% 1 a small amount every morning into both eyes
Continue: Maxitrol (neomycin-polymyxin-dexameth): ointment: 3.5-10,000-0.1 mg-unit/g-% 1 a small amount once a week into both eyes 08-
Continue: PreserVision AREDS 2 (vit c,s-no-qowks-lutein-zeaxan): capsule: 130-656-63-8 mg-unit-mg-mg 1 capsule twice a day by mouth
Continue: Retaine MGD (PF) (light mineral oil-min oil (pf)): dropperette: 0.5-0.5% 1 drop three times a day into both eyes
FUCH'S OU - discussed with patient and patient wants to proceed with cornea surgery OD first.
FUCHS' ENDOTHELIAL DYSTROPHY,OU:  SEVERE GUTTATA AND EDEMA. CONTINUE  CONSUELO SHERRI QHS. SCHEDULE OD DMEK.
Fuchs' Endothelial Dystrophy Counseling: The diagnosis and its pathophysiology was explained to the patient. The importance of continued follow-up including yearly endothelial cell counts and pachymetry measurements was also explained. The patient was counseled on the prescribed medical treatment and the early morning blurring of vision which will improve as the day progresses. The patient was advised of the possibility of decreased vision over time secondary to corneal edema. The need for cornea transplant has been discussed with patient for both eyes.
General:
Long discussion about cornea transplant with patient and her sister.
Medications:
New Prescription: ofloxacin (ofloxacin): drops: 0.3% 1 drop four times a day as directed into right eye 03-
New Prescription: prednisolone acetate (prednisolone acetate): drops,suspension: 1% 1 drop four times a day as directed into right eye 03-
Patient to lay flat after surgery til Dr. Mariah Day tells her otherwise.
Plan DMEK OD first.
She understands that OD is her near eye corrected after cataract surgery and it will still have near correction after cornea surgery.
Start after surgery in operative eye only.
no

## 2024-01-15 NOTE — PATIENT DISCUSSION
01/15/24 0900   Vital Signs   Temp 97.1 °F (36.2 °C)   Temp Source Oral   Pulse 81   Respirations 18   /71   MAP (Calculated) 94   BP Location Right upper arm   BP Method Automatic   Patient Position Semi fowlers   Oxygen Therapy   SpO2 95 %   O2 Device Nasal cannula   O2 Flow Rate (L/min) 2 L/min     Shift assessment complete - see flow sheet, patient answers mentation question accurately then random starts rambling about multiple other things. Once reminded pt in hospital patient able to carry same conversation again - easily re-direct able. Pt denies needs, call light within reach.              Continue Retaine MGD TID OU